# Patient Record
Sex: MALE | Race: WHITE | NOT HISPANIC OR LATINO | URBAN - METROPOLITAN AREA
[De-identification: names, ages, dates, MRNs, and addresses within clinical notes are randomized per-mention and may not be internally consistent; named-entity substitution may affect disease eponyms.]

---

## 2021-10-15 ENCOUNTER — OFFICE VISIT (OUTPATIENT)
Dept: URBAN - METROPOLITAN AREA CLINIC 121 | Facility: CLINIC | Age: 56
End: 2021-10-15

## 2022-05-20 ENCOUNTER — OFFICE VISIT (OUTPATIENT)
Dept: URBAN - METROPOLITAN AREA CLINIC 60 | Facility: CLINIC | Age: 57
End: 2022-05-20

## 2022-07-08 ENCOUNTER — OFFICE VISIT (OUTPATIENT)
Dept: URBAN - METROPOLITAN AREA SURGERY CENTER 4 | Facility: SURGERY CENTER | Age: 57
End: 2022-07-08

## 2022-07-09 ENCOUNTER — TELEPHONE ENCOUNTER (OUTPATIENT)
Dept: URBAN - METROPOLITAN AREA CLINIC 121 | Facility: CLINIC | Age: 57
End: 2022-07-09

## 2022-07-09 RX ORDER — CYCLOBENZAPRINE HYDROCHLORIDE 10 MG/1
TABLET, FILM COATED ORAL
Refills: 0 | OUTPATIENT
Start: 2022-03-30 | End: 2022-05-20

## 2022-07-09 RX ORDER — HUMAN INSULIN 100 [IU]/ML
INJECTION, SUSPENSION SUBCUTANEOUS
Refills: 0 | OUTPATIENT
Start: 2022-01-17 | End: 2022-05-18

## 2022-07-09 RX ORDER — HUMAN INSULIN 100 [IU]/ML
INJECTION, SUSPENSION SUBCUTANEOUS
Refills: 0 | OUTPATIENT
Start: 2022-01-17 | End: 2022-05-20

## 2022-07-10 ENCOUNTER — TELEPHONE ENCOUNTER (OUTPATIENT)
Dept: URBAN - METROPOLITAN AREA CLINIC 121 | Facility: CLINIC | Age: 57
End: 2022-07-10

## 2022-07-10 RX ORDER — HUMAN INSULIN 100 [IU]/ML
INJECTION, SUSPENSION SUBCUTANEOUS
Refills: 0 | Status: ACTIVE | COMMUNITY
Start: 2022-05-20

## 2022-07-22 ENCOUNTER — OFFICE VISIT (OUTPATIENT)
Dept: URBAN - METROPOLITAN AREA CLINIC 60 | Facility: CLINIC | Age: 57
End: 2022-07-22

## 2023-11-22 ENCOUNTER — LAB OUTSIDE AN ENCOUNTER (OUTPATIENT)
Dept: URBAN - METROPOLITAN AREA CLINIC 60 | Facility: CLINIC | Age: 58
End: 2023-11-22

## 2023-11-22 ENCOUNTER — OFFICE VISIT (OUTPATIENT)
Dept: URBAN - METROPOLITAN AREA CLINIC 60 | Facility: CLINIC | Age: 58
End: 2023-11-22
Payer: COMMERCIAL

## 2023-11-22 ENCOUNTER — TELEPHONE ENCOUNTER (OUTPATIENT)
Dept: URBAN - METROPOLITAN AREA CLINIC 63 | Facility: CLINIC | Age: 58
End: 2023-11-22

## 2023-11-22 VITALS
HEART RATE: 86 BPM | BODY MASS INDEX: 30.04 KG/M2 | TEMPERATURE: 97.7 F | SYSTOLIC BLOOD PRESSURE: 130 MMHG | HEIGHT: 69 IN | OXYGEN SATURATION: 98 % | RESPIRATION RATE: 12 BRPM | WEIGHT: 202.8 LBS | DIASTOLIC BLOOD PRESSURE: 76 MMHG

## 2023-11-22 DIAGNOSIS — B18.2 CHRONIC HEPATITIS C WITHOUT HEPATIC COMA: ICD-10-CM

## 2023-11-22 DIAGNOSIS — Z12.11 SCREENING FOR MALIGNANT NEOPLASM OF COLON: ICD-10-CM

## 2023-11-22 PROBLEM — 128302006: Status: ACTIVE | Noted: 2023-11-22

## 2023-11-22 PROCEDURE — 99214 OFFICE O/P EST MOD 30 MIN: CPT | Performed by: PHYSICIAN ASSISTANT

## 2023-11-22 RX ORDER — VELPATASVIR AND SOFOSBUVIR 100; 400 MG/1; MG/1
1 TABLET TABLET, FILM COATED ORAL ONCE A DAY
Qty: 30 TABLET | Refills: 2 | OUTPATIENT
Start: 2023-11-22 | End: 2024-02-20

## 2023-11-22 RX ORDER — VELPATASVIR AND SOFOSBUVIR 100; 400 MG/1; MG/1
1 TABLET TABLET, FILM COATED ORAL ONCE A DAY
Qty: 30 TABLET | Refills: 2
Start: 2023-11-22 | End: 2024-02-25

## 2023-11-22 RX ORDER — HUMAN INSULIN 100 [IU]/ML
INJECTION, SUSPENSION SUBCUTANEOUS
Refills: 0 | Status: ACTIVE | COMMUNITY
Start: 2022-05-20

## 2023-11-22 NOTE — HPI-TODAY'S VISIT:
58-year-old male is referred to the office for chronic hepatitis C and to schedule baseline screening colonoscopy. He was last seen in our office in May 2022.  He was scheduled for colonoscopy but canceled.  He had elevated LFTs on labs done in October 2021; , , alkaline phosphatase 123 total bilirubin 0.7.  He reported drinking 6-8 beers every night at that time.  Full hepatic work-up was recommended along with ultrasound but he did not complete his testing and was lost to follow-up.  He was admitted to [facility] in October.  He had multiple hospital admissions recently for evaluation of syncope.  He was followed by cardiology.  He had prior echocardiogram and nuclear stress test which were negative.  His EKG in the hospital showed sinus rhythm with right bundle branch block and left anterior fascicular block but no arrhythmias.  He had a left heart cath on October 26, 2023 which revealed mild nonobstructive disease.  He had electrophysiology study and was found to have evidence of infranodal disease and had a pacemaker placed on October 31, 2023. He had labs done during his hospital stay.  He had an HCV RNA of 5,930,000 IU/mL.  CBC on October 25, 2023 demonstrated low platelets at 121,000.  CMP was significant for , , alkaline phosphatase 196, total bilirubin 0.7.  INR on October 21 was 1.06.  Serum ammonia was normal at 16 on October 17.  He did not have any abdominal imaging completed during any of his hospital admissions recently.  He follows up in our office today doing well. He has not had any syncopal episodes since he had his pacemaker placed. He completely stopped drinking alcohol 3 months ago. He has no GI complaints. He is scheduled for an ultrasound of the liver on 12/15/23. No prior colonoscopy.   He has no family history of colon cancer.

## 2023-12-08 ENCOUNTER — TELEPHONE ENCOUNTER (OUTPATIENT)
Dept: URBAN - METROPOLITAN AREA CLINIC 60 | Facility: CLINIC | Age: 58
End: 2023-12-08

## 2023-12-08 LAB — HIV AG/AB, 4TH GEN: (no result)

## 2023-12-12 LAB
A/G RATIO: 0.9
AFP, SERUM, TUMOR MARKER: 19.6
ALBUMIN: 3.8
ALKALINE PHOSPHATASE: 157
ALT (SGPT): 128
AST (SGOT): 98
BILIRUBIN, TOTAL: 0.5
BUN/CREATININE RATIO: 13
BUN: 9
CALCIUM: 9.2
CARBON DIOXIDE, TOTAL: 25
CHLORIDE: 102
CREATININE: 0.68
EGFR: 106
GLOBULIN, TOTAL: 4.1
GLUCOSE: 271
HEMATOCRIT: 48.1
HEMOGLOBIN: 16.9
HEPATITIS B SURFACE ANTIGEN: (no result)
HEPATITIS C ANTIBODY: REACTIVE
HEPATITIS C VIRAL RNA: (no result)
INR: 1
MCH: 33.5
MCHC: 35.1
MCV: 95.2
MPV: 10.8
PLATELET COUNT: 116
POTASSIUM: 4.1
PROTEIN, TOTAL: 7.9
PT: 10.9
RDW: 12.1
RED BLOOD CELL COUNT: 5.05
SODIUM: 136
WHITE BLOOD CELL COUNT: 6.5

## 2023-12-13 ENCOUNTER — TELEPHONE ENCOUNTER (OUTPATIENT)
Dept: URBAN - METROPOLITAN AREA CLINIC 63 | Facility: CLINIC | Age: 58
End: 2023-12-13

## 2023-12-13 ENCOUNTER — LAB OUTSIDE AN ENCOUNTER (OUTPATIENT)
Dept: URBAN - METROPOLITAN AREA CLINIC 63 | Facility: CLINIC | Age: 58
End: 2023-12-13

## 2023-12-14 LAB
MITOGEN-NIL: 9.49
QUANTIFERON NIL VALUE: 0.04
QUANTIFERON TB1 AG VALUE: 0
QUANTIFERON TB2 AG VALUE: 0.01
QUANTIFERON-TB GOLD PLUS: NEGATIVE

## 2023-12-26 ENCOUNTER — LAB OUTSIDE AN ENCOUNTER (OUTPATIENT)
Dept: URBAN - METROPOLITAN AREA CLINIC 60 | Facility: CLINIC | Age: 58
End: 2023-12-26

## 2023-12-26 ENCOUNTER — TELEPHONE ENCOUNTER (OUTPATIENT)
Dept: URBAN - METROPOLITAN AREA CLINIC 60 | Facility: CLINIC | Age: 58
End: 2023-12-26

## 2023-12-29 ENCOUNTER — OFFICE VISIT (OUTPATIENT)
Dept: URBAN - METROPOLITAN AREA SURGERY CENTER 4 | Facility: SURGERY CENTER | Age: 58
End: 2023-12-29
Payer: COMMERCIAL

## 2023-12-29 ENCOUNTER — CLAIMS CREATED FROM THE CLAIM WINDOW (OUTPATIENT)
Dept: URBAN - METROPOLITAN AREA CLINIC 4 | Facility: CLINIC | Age: 58
End: 2023-12-29
Payer: COMMERCIAL

## 2023-12-29 DIAGNOSIS — K63.89 OTHER SPECIFIED DISEASES OF INTESTINE: ICD-10-CM

## 2023-12-29 DIAGNOSIS — K64.0 GRADE I INTERNAL HEMORRHOIDS: ICD-10-CM

## 2023-12-29 DIAGNOSIS — K64.0 FIRST DEGREE HEMORRHOIDS: ICD-10-CM

## 2023-12-29 DIAGNOSIS — K57.30 DIVERTICULOSIS OF LARGE INTESTINE WITHOUT PERFORATION OR ABSCESS WITHOUT BLEEDING: ICD-10-CM

## 2023-12-29 DIAGNOSIS — Z12.11 COLON CANCER SCREENING (HIGH RISK): ICD-10-CM

## 2023-12-29 DIAGNOSIS — K63.5 POLYP OF SIGMOID COLON, UNSPECIFIED TYPE: ICD-10-CM

## 2023-12-29 DIAGNOSIS — Z12.11 ENCOUNTER FOR SCREENING FOR MALIGNANT NEOPLASM OF COLON: ICD-10-CM

## 2023-12-29 DIAGNOSIS — K57.30 DIVERTICULA, COLON: ICD-10-CM

## 2023-12-29 DIAGNOSIS — K63.5 BENIGN COLON POLYPS: ICD-10-CM

## 2023-12-29 DIAGNOSIS — D12.7 BENIGN NEOPLASM OF RECTOSIGMOID JUNCTION: ICD-10-CM

## 2023-12-29 PROCEDURE — 45380 COLONOSCOPY AND BIOPSY: CPT | Performed by: INTERNAL MEDICINE

## 2023-12-29 PROCEDURE — 00811 ANES LWR INTST NDSC NOS: CPT | Performed by: NURSE ANESTHETIST, CERTIFIED REGISTERED

## 2023-12-29 PROCEDURE — 88305 TISSUE EXAM BY PATHOLOGIST: CPT | Performed by: PATHOLOGY

## 2023-12-29 RX ORDER — HUMAN INSULIN 100 [IU]/ML
INJECTION, SUSPENSION SUBCUTANEOUS
Refills: 0 | Status: ACTIVE | COMMUNITY
Start: 2022-05-20

## 2023-12-29 RX ORDER — VELPATASVIR AND SOFOSBUVIR 100; 400 MG/1; MG/1
1 TABLET TABLET, FILM COATED ORAL ONCE A DAY
Qty: 30 TABLET | Refills: 2 | Status: ACTIVE | COMMUNITY
Start: 2023-11-22 | End: 2024-02-25

## 2024-01-04 ENCOUNTER — TELEPHONE ENCOUNTER (OUTPATIENT)
Dept: URBAN - METROPOLITAN AREA CLINIC 63 | Facility: CLINIC | Age: 59
End: 2024-01-04

## 2024-01-04 RX ORDER — VELPATASVIR AND SOFOSBUVIR 100; 400 MG/1; MG/1
1 TABLET TABLET, FILM COATED ORAL ONCE A DAY
Qty: 30 TABLET | Refills: 2
Start: 2023-11-22 | End: 2024-04-03

## 2024-01-09 ENCOUNTER — TELEPHONE ENCOUNTER (OUTPATIENT)
Dept: URBAN - METROPOLITAN AREA CLINIC 60 | Facility: CLINIC | Age: 59
End: 2024-01-09

## 2024-01-10 ENCOUNTER — LAB OUTSIDE AN ENCOUNTER (OUTPATIENT)
Dept: URBAN - METROPOLITAN AREA CLINIC 60 | Facility: CLINIC | Age: 59
End: 2024-01-10

## 2024-01-10 ENCOUNTER — OFFICE VISIT (OUTPATIENT)
Dept: URBAN - METROPOLITAN AREA CLINIC 60 | Facility: CLINIC | Age: 59
End: 2024-01-10
Payer: COMMERCIAL

## 2024-01-10 VITALS
TEMPERATURE: 99 F | DIASTOLIC BLOOD PRESSURE: 72 MMHG | RESPIRATION RATE: 12 BRPM | HEART RATE: 91 BPM | OXYGEN SATURATION: 97 % | HEIGHT: 69 IN | SYSTOLIC BLOOD PRESSURE: 132 MMHG | BODY MASS INDEX: 30.36 KG/M2 | WEIGHT: 205 LBS

## 2024-01-10 DIAGNOSIS — Z86.010 PERSONAL HISTORY OF COLONIC POLYPS: ICD-10-CM

## 2024-01-10 DIAGNOSIS — D13.5 ADENOMYOMATOSIS OF GALLBLADDER: ICD-10-CM

## 2024-01-10 DIAGNOSIS — K74.69 OTHER CIRRHOSIS OF LIVER: ICD-10-CM

## 2024-01-10 DIAGNOSIS — B18.2 CHRONIC HEPATITIS C WITHOUT HEPATIC COMA: ICD-10-CM

## 2024-01-10 PROBLEM — 428283002: Status: ACTIVE | Noted: 2024-01-10

## 2024-01-10 PROBLEM — 19943007: Status: ACTIVE | Noted: 2024-01-10

## 2024-01-10 PROCEDURE — 99214 OFFICE O/P EST MOD 30 MIN: CPT | Performed by: PHYSICIAN ASSISTANT

## 2024-01-10 RX ORDER — VELPATASVIR AND SOFOSBUVIR 100; 400 MG/1; MG/1
1 TABLET TABLET, FILM COATED ORAL ONCE A DAY
Qty: 30 TABLET | Refills: 2 | Status: ACTIVE | COMMUNITY
Start: 2023-11-22 | End: 2024-04-03

## 2024-01-10 RX ORDER — HUMAN INSULIN 100 [IU]/ML
INJECTION, SUSPENSION SUBCUTANEOUS
Refills: 0 | Status: ACTIVE | COMMUNITY
Start: 2022-05-20

## 2024-01-10 RX ORDER — HYDROXYZINE HYDROCHLORIDE 25 MG/1
1 TABLET AS NEEDED TABLET, FILM COATED ORAL
Qty: 30 | Refills: 3 | OUTPATIENT
Start: 2024-01-10

## 2024-01-10 NOTE — HPI-TODAY'S VISIT:
58-year-old male with diabetes and cardiac dysrhythmia with pacemaker was referred to the office in November 2022 for treatment of chronic hepatitis C and to schedule baseline screening colonoscopy.  He has a history of drinking 6-8 beers every night but stopped drinking completely 3 months prior to his office visit in November.  He has a history of elevated LFTs on labs done in October 2021.  His AST and ALT were elevated to 103 and 153 respectively.  Labs were done December 7, 2023.  His CBC was unremarkable with the exception of low platelets at 116.  CMP demonstrated an AST 98, , alkaline phosphatase 157, total bilirubin 0.5.  INR 1.0.  Alpha-fetoprotein 19.6.  His HCV antibody was reactive, the viral load is still pending.  Genotype 1b.  He was screened for HIV, tuberculosis, and hepatitis B which were negative.  His HCV viral load was 5,930,000 IU/mL in October 2023. MELD Na 6.   Abdominal ultrasound was done December 11, 2023 which demonstrated hepatic steatosis and hepatomegaly along with gallbladder adenomyomatosis. Because of his elevated AFP triple phase CT was done on January 4, 2024.  This demonstrated large fatty liver with a nodular contour compatible with cirrhosis.  No signs of portal hypertension or hepatoma.  Hemodynamically significant right renal artery stenosis was also seen.  FibroScan was done December 11, 2023 which demonstrated mild steatosis (S1) with severe fibrosis (F4).  Colonoscopy was done for colorectal cancer screening on December 29, 2023.  His colonoscopy demonstrated two 4 mm polyps which were removed from the rectosigmoid colon.  The pathology showed tubular adenoma and hyperplastic polyp.  There was a moderate amount of liquid stool found in the entire colon, interfering with visualization.  Lavage of the area was performed resulting in clearance with adequate visualization, although smaller polyps could be missed.  Repeat colonoscopy was recommended in 3 years.  He follows up doing well.  He had no problems following his procedure.  He was approved for 12-week course of Epclusa on January 4, 2024. He is awaiting delivery. He has no GI complaints. He is not drinking alcohol. He reports generalized itching at night. Denies any rash. He is scheduled to see hematology for elevated iron levels through his PCP.

## 2024-01-19 ENCOUNTER — OUT OF OFFICE VISIT (OUTPATIENT)
Dept: URBAN - METROPOLITAN AREA SURGERY CENTER 4 | Facility: SURGERY CENTER | Age: 59
End: 2024-01-19
Payer: COMMERCIAL

## 2024-01-19 ENCOUNTER — CLAIMS CREATED FROM THE CLAIM WINDOW (OUTPATIENT)
Dept: URBAN - METROPOLITAN AREA CLINIC 4 | Facility: CLINIC | Age: 59
End: 2024-01-19
Payer: COMMERCIAL

## 2024-01-19 DIAGNOSIS — K74.60 CIRRHOSIS OF LIVER WITHOUT ASCITES, UNSPECIFIED HEPATIC CIRRHOSIS TYPE: ICD-10-CM

## 2024-01-19 DIAGNOSIS — K74.60 CIRRHOSIS: ICD-10-CM

## 2024-01-19 DIAGNOSIS — T47.8X5A ADVERSE EFFECT OF OTHER AGENTS PRIMARILY AFFECTING GASTROINTESTINAL SYSTEM, INITIAL ENCOUNTER: ICD-10-CM

## 2024-01-19 DIAGNOSIS — R68.89 ERYTHEMATOUS MUCOSA: ICD-10-CM

## 2024-01-19 DIAGNOSIS — Z12.11 COLON CANCER SCREENING (HIGH RISK): ICD-10-CM

## 2024-01-19 DIAGNOSIS — K31.89 OTHER DISEASES OF STOMACH AND DUODENUM: ICD-10-CM

## 2024-01-19 DIAGNOSIS — K76.6 PORTAL HYPERTENSION: ICD-10-CM

## 2024-01-19 PROCEDURE — 88312 SPECIAL STAINS GROUP 1: CPT | Performed by: PATHOLOGY

## 2024-01-19 PROCEDURE — 43239 EGD BIOPSY SINGLE/MULTIPLE: CPT | Performed by: INTERNAL MEDICINE

## 2024-01-19 PROCEDURE — 88305 TISSUE EXAM BY PATHOLOGIST: CPT | Performed by: PATHOLOGY

## 2024-01-19 PROCEDURE — 00731 ANES UPR GI NDSC PX NOS: CPT | Performed by: NURSE ANESTHETIST, CERTIFIED REGISTERED

## 2024-01-19 RX ORDER — VELPATASVIR AND SOFOSBUVIR 100; 400 MG/1; MG/1
1 TABLET TABLET, FILM COATED ORAL ONCE A DAY
Qty: 30 TABLET | Refills: 2 | Status: ACTIVE | COMMUNITY
Start: 2023-11-22 | End: 2024-04-03

## 2024-01-19 RX ORDER — HYDROXYZINE HYDROCHLORIDE 25 MG/1
1 TABLET AS NEEDED TABLET, FILM COATED ORAL
Qty: 30 | Refills: 3 | Status: ACTIVE | COMMUNITY
Start: 2024-01-10

## 2024-01-19 RX ORDER — HUMAN INSULIN 100 [IU]/ML
INJECTION, SUSPENSION SUBCUTANEOUS
Refills: 0 | Status: ACTIVE | COMMUNITY
Start: 2022-05-20

## 2024-02-10 LAB
A/G RATIO: 1.1
AFP, SERUM, TUMOR MARKER: 13
ALBUMIN: 4.1
ALKALINE PHOSPHATASE: 132
ALT (SGPT): 29
AST (SGOT): 27
BILIRUBIN, TOTAL: 0.4
BUN/CREATININE RATIO: 15
BUN: 10
CALCIUM: 9.4
CARBON DIOXIDE, TOTAL: 25
CHLORIDE: 101
CREATININE: 0.67
EGFR: 107
GLOBULIN, TOTAL: 3.7
GLUCOSE: 244
HCV RNA, QUANTITATIVE REAL TIME PCR: 1.69
HCV RNA, QUANTITATIVE REAL TIME PCR: 49
HEMATOCRIT: 49.3
HEMOGLOBIN: 17.1
HEPATITIS C ANTIBODY: REACTIVE
INR: 1
MCH: 32.7
MCHC: 34.7
MCV: 94.3
MPV: 10.9
PLATELET COUNT: 137
POTASSIUM: 4
PROTEIN, TOTAL: 7.8
PT: 10.7
RDW: 12.4
RED BLOOD CELL COUNT: 5.23
SODIUM: 137
WHITE BLOOD CELL COUNT: 7.5

## 2024-02-14 ENCOUNTER — OV EP (OUTPATIENT)
Dept: URBAN - METROPOLITAN AREA CLINIC 60 | Facility: CLINIC | Age: 59
End: 2024-02-14
Payer: COMMERCIAL

## 2024-02-14 VITALS
BODY MASS INDEX: 30.36 KG/M2 | DIASTOLIC BLOOD PRESSURE: 74 MMHG | OXYGEN SATURATION: 98 % | RESPIRATION RATE: 12 BRPM | WEIGHT: 205 LBS | HEART RATE: 76 BPM | HEIGHT: 69 IN | SYSTOLIC BLOOD PRESSURE: 122 MMHG | TEMPERATURE: 99 F

## 2024-02-14 DIAGNOSIS — Z86.010 PERSONAL HISTORY OF COLONIC POLYPS: ICD-10-CM

## 2024-02-14 DIAGNOSIS — D13.5 ADENOMYOMATOSIS OF GALLBLADDER: ICD-10-CM

## 2024-02-14 DIAGNOSIS — B18.2 CHRONIC HEPATITIS C WITHOUT HEPATIC COMA: ICD-10-CM

## 2024-02-14 DIAGNOSIS — K74.69 OTHER CIRRHOSIS OF LIVER: ICD-10-CM

## 2024-02-14 PROCEDURE — 99214 OFFICE O/P EST MOD 30 MIN: CPT | Performed by: PHYSICIAN ASSISTANT

## 2024-02-14 RX ORDER — VELPATASVIR AND SOFOSBUVIR 100; 400 MG/1; MG/1
1 TABLET TABLET, FILM COATED ORAL ONCE A DAY
Qty: 30 TABLET | Refills: 2 | Status: ACTIVE | COMMUNITY
Start: 2023-11-22 | End: 2024-04-03

## 2024-02-14 RX ORDER — HYDROXYZINE HYDROCHLORIDE 25 MG/1
1 TABLET AS NEEDED TABLET, FILM COATED ORAL
Qty: 30 | Refills: 3 | OUTPATIENT

## 2024-02-14 RX ORDER — HUMAN INSULIN 100 [IU]/ML
INJECTION, SUSPENSION SUBCUTANEOUS
Refills: 0 | Status: ACTIVE | COMMUNITY
Start: 2022-05-20

## 2024-02-14 RX ORDER — HYDROXYZINE HYDROCHLORIDE 25 MG/1
1 TABLET AS NEEDED TABLET, FILM COATED ORAL
Qty: 30 | Refills: 3 | Status: ACTIVE | COMMUNITY
Start: 2024-01-10

## 2024-02-14 NOTE — HPI-TODAY'S VISIT:
58-year-old male with diabetes and cardiac dysrhythmia with pacemaker, cirrhosis secondary to chronic hepatitis C and alcohol presents to the office for follow-up.  He was last seen in the office January 10, 2024.  He was doing well at the time.  He had recently been approved for 12-week course of Epclusa and was awaiting his delivery.  He had no GI complaints.  He stopped drinking alcohol completely.  He reported generalized itching at night without any visible rash.  He was scheduled to see hematology for elevated iron levels.  He has a history of drinking 6-8 beers every night but stopped drinking alcohol in September 2023.  He follows up today after having labs done on February 7, 2024.  His CBC was normal with the exception of low platelets at 137.  CMP revealed sodium 137, creatinine 0.67, AST 27, ALT 29, alkaline phosphatase 132, total bilirubin 0.4, albumin 4.1.  INR 1.0.  Alpha-fetoprotein 13. MELD Na 6.   MRI of the liver was ordered at his last office visit but results have not been received.  CT abdomenwith and without contrast was done on January 4, 2024 which demonstrated large fatty liver with a nodular contour compatible with liver cirrhosis.  No signs of portal hypertension or hepatoma.  EGD was done to screen for varices on January 19, 2024.  His EGD demonstrated a normal-appearing esophagus.  Mildly erythematous mucosa was found in the stomach.  Gastric antral biopsy showed proton pump inhibitor effect.  No evidence of H. pylori or intestinal metaplasia.  Mild portal hypertensive gastropathy in the gastric body.  The duodenum appeared normal.  No evidence of esophageal or gastric varices.  He follows up doing well.  He had no problems following his procedure.  He was prescribed hydroxyzine 25 mg daily at bedtime for generalized pruritus. His pruritis resolved. He has been on Epclusa for about 3 weeks and is tolerating the medication well. He has no Gi complaints. He feels "strong as ever." No pyrosis, dysphagia, odynophagia, nausea, vomiting, abdominal pain, constipation, diarrhea, melena, hematochezia. He continues to abstain from alcohol.   He was referred to general surgery at his last office visit after his ultrasound demonstrated adenomyomatosis of the gallbladder.  Appointment scheduling is pending insurance authorization.  Elastography was done December 11, 2023 which demonstrated mild steatosis (S1) and severe fibrosis (F4).  Screening colonoscopy was done December 29, 2023.  His colonoscopy demonstrated 2 polyps measuring 4 mm which were removed from the rectosigmoid colon.  The pathology showed tubular adenoma and hyperplastic polyp.  There was a moderate amount of liquid stool found in the entire colon, interfering with visualization.  Lavage of the area was performed resulting in clearance with adequate visualization, although smaller polyps could be missed.  Repeat colonoscopy was recommended in 3 years.

## 2024-03-27 ENCOUNTER — LAB (OUTPATIENT)
Dept: URBAN - METROPOLITAN AREA CLINIC 60 | Facility: CLINIC | Age: 59
End: 2024-03-27

## 2024-03-29 ENCOUNTER — OV EP (OUTPATIENT)
Dept: URBAN - METROPOLITAN AREA CLINIC 60 | Facility: CLINIC | Age: 59
End: 2024-03-29
Payer: COMMERCIAL

## 2024-03-29 VITALS
HEART RATE: 92 BPM | HEIGHT: 69 IN | RESPIRATION RATE: 12 BRPM | SYSTOLIC BLOOD PRESSURE: 130 MMHG | DIASTOLIC BLOOD PRESSURE: 72 MMHG | WEIGHT: 204 LBS | TEMPERATURE: 99 F | BODY MASS INDEX: 30.21 KG/M2 | OXYGEN SATURATION: 98 %

## 2024-03-29 DIAGNOSIS — Z86.010 PERSONAL HISTORY OF COLONIC POLYPS: ICD-10-CM

## 2024-03-29 DIAGNOSIS — B18.2 CHRONIC HEPATITIS C WITHOUT HEPATIC COMA: ICD-10-CM

## 2024-03-29 DIAGNOSIS — L29.9 GENERALIZED PRURITUS: ICD-10-CM

## 2024-03-29 DIAGNOSIS — K74.69 OTHER CIRRHOSIS OF LIVER: ICD-10-CM

## 2024-03-29 DIAGNOSIS — D13.5 ADENOMYOMATOSIS OF GALLBLADDER: ICD-10-CM

## 2024-03-29 PROCEDURE — 99214 OFFICE O/P EST MOD 30 MIN: CPT | Performed by: PHYSICIAN ASSISTANT

## 2024-03-29 RX ORDER — HUMAN INSULIN 100 [IU]/ML
INJECTION, SUSPENSION SUBCUTANEOUS
Refills: 0 | Status: ACTIVE | COMMUNITY
Start: 2022-05-20

## 2024-03-29 RX ORDER — HYDROXYZINE HYDROCHLORIDE 25 MG/1
1 TABLET AS NEEDED TABLET, FILM COATED ORAL
Qty: 30 | Refills: 3 | Status: ACTIVE | COMMUNITY

## 2024-03-29 RX ORDER — VELPATASVIR AND SOFOSBUVIR 100; 400 MG/1; MG/1
1 TABLET TABLET, FILM COATED ORAL ONCE A DAY
Qty: 30 TABLET | Refills: 2 | Status: ACTIVE | COMMUNITY
Start: 2023-11-22 | End: 2024-04-03

## 2024-03-29 NOTE — HPI-TODAY'S VISIT:
58-year-old male with diabetes and cardiac dysrhythmia with pacemaker, cirrhosis secondary to hepatitis C and alcohol presents for follow-up.  He was last seen in the office February 14, 2024.  He was doing well at the time.  He was treated with hydroxyzine for pruritus with resolution of his symptoms.  He was started on a 12-week course of Epclusa at the end of January 2024.  He was advised to continue his 12-week course of Epclusa.  Labs were ordered at his last office visit which he completed a few days ago but results are still pending.   He follows up doing well.  He has no GI complaints. He feels great. He has about 3 weeks left to complete his course of Epclusa.   EGD was done to screen for varices on January 19, 2024.  His EGD demonstrated a normal-appearing esophagus.  Mildly erythematous mucosa was found in the stomach.  Gastric antral biopsy showed proton pump inhibitor effect.  No evidence of H. pylori or intestinal metaplasia.  Mild portal hypertensive gastropathy in the body.  Normal duodenum.  No evidence of gastric or esophageal varices.  CT abdomen with and without contrast was done January 4, 2024 which demonstrated large fatty liver with nodular contour compatible with cirrhosis.  No signs of portal hypertension or hepatoma.  Labs were done February 7, 2024.  CBC was normal with the exception of low platelets at 137.  CMP revealed sodium 137, creatinine 0.67, AST 27, ALT 29, alkaline phosphatase 132, total bilirubin 0.4, albumin 4.1, INR 1.0, alpha-fetoprotein 13.  MELD NA 6.  Elastography was done December 11, 2023 which demonstrated mild steatosis (S1) and severe fibrosis (F4).  Screening colonoscopy was done December 29, 2023.  His colonoscopy demonstrated 2 polyps measuring 4 mm which were removed from the rectosigmoid colon.  The pathology showed tubular adenoma and hyperplastic polyp.  There was a moderate amount of stool in the entire colon.  Lavage of the area was performed resulting in clearance with adequate visualization, although smaller polyps could be missed.  Repeat colonoscopy was recommended in 3 years.

## 2024-07-09 ENCOUNTER — LAB OUTSIDE AN ENCOUNTER (OUTPATIENT)
Dept: URBAN - METROPOLITAN AREA CLINIC 60 | Facility: CLINIC | Age: 59
End: 2024-07-09

## 2024-07-26 ENCOUNTER — OFFICE VISIT (OUTPATIENT)
Dept: URBAN - METROPOLITAN AREA CLINIC 60 | Facility: CLINIC | Age: 59
End: 2024-07-26